# Patient Record
Sex: MALE | Race: WHITE | NOT HISPANIC OR LATINO | URBAN - METROPOLITAN AREA
[De-identification: names, ages, dates, MRNs, and addresses within clinical notes are randomized per-mention and may not be internally consistent; named-entity substitution may affect disease eponyms.]

---

## 2017-01-05 ENCOUNTER — APPOINTMENT (OUTPATIENT)
Dept: URBAN - METROPOLITAN AREA CLINIC 319 | Age: 82
Setting detail: DERMATOLOGY
End: 2017-01-05

## 2017-01-05 PROBLEM — D04.0 CARCINOMA IN SITU OF SKIN OF LIP: Status: ACTIVE | Noted: 2017-01-05

## 2017-01-05 PROBLEM — C44.01 BASAL CELL CARCINOMA OF SKIN OF LIP: Status: ACTIVE | Noted: 2017-01-05

## 2017-01-05 PROCEDURE — 17311 MOHS 1 STAGE H/N/HF/G: CPT

## 2017-01-05 PROCEDURE — OTHER MOHS SURGERY: OTHER

## 2017-01-05 PROCEDURE — OTHER COUNSELING: OTHER

## 2017-01-05 PROCEDURE — 11100: CPT | Mod: 59

## 2017-01-05 PROCEDURE — 14060 TIS TRNFR E/N/E/L 10 SQ CM/<: CPT

## 2017-01-05 PROCEDURE — OTHER BIOPSY BY SHAVE METHOD: OTHER

## 2017-01-05 NOTE — PROCEDURE: BIOPSY BY SHAVE METHOD
Dressing: no dressing applied
Detail Level: Detailed
Consent: Written consent was obtained and risks were reviewed including but not limited to scarring, infection, bleeding, scabbing, incomplete removal, nerve damage and allergy to anesthesia.
Wound Care: Vaseline
Notification Instructions: Patient will be notified of biopsy results. However, patient instructed to call the office if not contacted within 2 weeks.
Bill For Surgical Tray: no
Anesthesia Volume In Cc (Will Not Render If 0): 1.5
Biopsy Method: Dermablade
Type Of Destruction Used: Curettage
Additional Anesthesia Type: 1% lidocaine with epinephrine
Post-Care Instructions: I reviewed with the patient in detail post-care instructions. Patient is to keep the biopsy site dry overnight, and then apply vaseline twice daily until healed. Patient may apply hydrogen peroxide soaks to remove any crusting.
X Size Of Lesion In Cm: 0
Hemostasis: Drysol
Biopsy Type: H and E
Billing Type: Third-Party Bill

## 2017-01-05 NOTE — PROCEDURE: MOHS SURGERY
Body Location Override (Optional - Billing Will Still Be Based On Selected Body Map Location If Applicable): Left upper cutaneous lip

## 2017-01-30 ENCOUNTER — APPOINTMENT (OUTPATIENT)
Dept: URBAN - METROPOLITAN AREA CLINIC 319 | Age: 82
Setting detail: DERMATOLOGY
End: 2017-01-30

## 2017-01-30 PROBLEM — C44.01 BASAL CELL CARCINOMA OF SKIN OF LIP: Status: ACTIVE | Noted: 2017-01-30

## 2017-01-30 PROCEDURE — OTHER MOHS SURGERY: OTHER

## 2017-01-30 PROCEDURE — OTHER COUNSELING: OTHER

## 2017-01-30 PROCEDURE — 17311 MOHS 1 STAGE H/N/HF/G: CPT | Mod: 79

## 2017-01-30 PROCEDURE — 12052 INTMD RPR FACE/MM 2.6-5.0 CM: CPT | Mod: 79

## 2019-09-15 NOTE — PROCEDURE: MOHS SURGERY
No Mohs Rapid Report Verbiage: The area of clinically evident tumor was marked with skin marking ink and appropriately hatched.  The initial incision was made following the Mohs approach through the skin.  The specimen was taken to the lab, divided into the necessary number of pieces, chromacoded and processed according to the Mohs protocol.  This was repeated in successive stages until a tumor free defect was achieved.